# Patient Record
(demographics unavailable — no encounter records)

---

## 2025-06-02 NOTE — ASSESSMENT
[FreeTextEntry1] : Impression: This 64-year-old female patient presents with her daughter.  She has a history consistent with chronic migraine headache disorder without aura.  She has been forgetful for the last 5 years and there is a family history of dementia in her mother presenting in her mid 70s.  She scored 23/30 on the MMSE.  She has an approximate 1-year history of tremor most consistent with the diagnosis of essential tremor.  Family history is negative in this regard.  Neurological exam reveals equivocal findings bradtphrenia and mild bradykinesia of questionable significance.  Recommendations: MRI brain with and without contrast.  Neuropsychological testing at [facility].  Additional laboratory testing including B12 folate B6 methylmalonic acid.  Consider brain F dopa PET/CT.  Office follow-up.  Discussed in detail with the patient's daughter.

## 2025-06-02 NOTE — HISTORY OF PRESENT ILLNESS
[FreeTextEntry1] : This patient is seen for an office consultation with her daughter.  She is a 64-year-old right-handed female who has several problems.    She has a chronic history of migraine with predominantly left hemicranial frontal headache for many years.  She denies aura.  Triggers can be lack of sleep and diet such as onions.  She reports having a negative MRI of the brain in this regard years ago.  That result is not available.  In addition she describes chronic forgetfulness especially short-term and she is repetitive with some progression during the last 5 years.  The patient denies other aspects of cognitive decline such as issues with speech language executive function and visual-spatial difficulty.  She denies being depressed or anxious.  There is a family history of Alzheimer's disease/dementia in her mother presenting in her 70s.  In addition the patient describes tremor affecting both hands with posture such as when holding items but not at rest.  She has had this complaint for the last 1 year and symptoms are variable and can be related to certain times of the day lack of sleep and caffeine.  There is no family history of tremor.  Past history significant for low B12 level which has been supplemented though I do not have results to confirm that diagnosis.  Hypothyroidism hyperlipidemia chronic bilateral foot pain treated with gabapentin 100 mg as needed with negative EMG/NCV reported years ago.  Laboratory from PCP reviewed but there is no B12 level available.

## 2025-06-02 NOTE — PHYSICAL EXAM
[FreeTextEntry1] : Head:  Normocephalic Neck: Supple nontender no carotid bruits.    Mental Status:  Alert Oriented X3 Speech normal and no aphasia or dysarthria.  Patient appears somewhat bradyphrenic but she denies being depressed.  She scores 23/30 on the MMSE.  She recalled 1/3 words.  She had difficulty spelling world backwards.  She had difficulty repeating a simple phrase and copying intersecting pentagons.  She also did not have the correct season.  Cranial Nerves:  PERRL, Fundi not visualized visual Fields full  EOMI no diplopia no ptosis no nystagmus, V through XII intact.  Motor: Mild bilateral postural hand tremor which is not present at rest.  No dysmetria.  Tone is normal without cogwheeling.  There is questionable mild bradykinesia.  DTRs: Symmetric and 2+.  Plantars flexor.  No Clonus.  Sensory:  Normal testing with pin light touch and vibration and  Joint position sense.    Gait: Regular with questionable bilateral decrease in arm swing and mildly stooped posture.  Able to tandem walk the Romberg is negative.